# Patient Record
Sex: FEMALE | Race: BLACK OR AFRICAN AMERICAN
[De-identification: names, ages, dates, MRNs, and addresses within clinical notes are randomized per-mention and may not be internally consistent; named-entity substitution may affect disease eponyms.]

---

## 2019-01-23 PROBLEM — Z00.00 ENCOUNTER FOR PREVENTIVE HEALTH EXAMINATION: Status: ACTIVE | Noted: 2019-01-23

## 2019-02-04 ENCOUNTER — APPOINTMENT (OUTPATIENT)
Dept: VASCULAR SURGERY | Facility: CLINIC | Age: 69
End: 2019-02-04
Payer: MEDICARE

## 2019-02-04 VITALS
BODY MASS INDEX: 41.95 KG/M2 | SYSTOLIC BLOOD PRESSURE: 134 MMHG | HEIGHT: 70 IN | OXYGEN SATURATION: 97 % | WEIGHT: 293 LBS | TEMPERATURE: 98 F | HEART RATE: 75 BPM | DIASTOLIC BLOOD PRESSURE: 88 MMHG

## 2019-02-04 PROCEDURE — 99203 OFFICE O/P NEW LOW 30 MIN: CPT

## 2019-02-06 NOTE — ASSESSMENT
[FreeTextEntry1] : 69 y/o F with right leg pain and numbness and tingling in toes and fingers. On exam, patient has strong palpable pulses throughout legs, skin is warm and well perfused. No vascular issues at this time. Suspect her symptoms are related to chronic back pain and issues. She will follow-up here as needed.

## 2019-02-06 NOTE — HISTORY OF PRESENT ILLNESS
[FreeTextEntry1] : 67 y/o F with HLD, HTN, and multiple issues with her back here for initial evaluation of right leg pain and numbness and tinling in her toes and fingers. patient reports pain throughout her entire right leg, worse with laying down and at the end of the day. She reports that she has developed two dark spot to her right calf and was told by her dermatologist these are possible varicose veins. She also reports numbness and tingling in her toes and fingers as well. She also endorses some associated swelling in her legs. She denies any claudication or ulcerations. She denies any history of DVT.

## 2019-02-06 NOTE — PHYSICAL EXAM
[Respiratory Effort] : normal respiratory effort [Normal Heart Sounds] : normal heart sounds [2+] : left 2+ [Ankle Swelling (On Exam)] : present [Varicose Veins Of Lower Extremities] : present [Varicose Veins Of The Right Leg] : of the right leg [No Rash or Lesion] : No rash or lesion [Alert] : alert [Calm] : calm [JVD] : no jugular venous distention  [] : not present [de-identified] : Well appearing, NAD, obese [de-identified] : NCAT [de-identified] : FROM, 5/5 strength

## 2019-02-06 NOTE — END OF VISIT
[FreeTextEntry3] : Documented by Behzad Gonzales acting as a scribe for Dr. Mary Ellen Newton on 2/4/2019

## 2019-10-18 ENCOUNTER — HOSPITAL ENCOUNTER (OUTPATIENT)
Dept: HOSPITAL 74 - JASUSAT | Age: 69
LOS: 1 days | Discharge: HOME | End: 2019-10-19
Attending: PLASTIC SURGERY
Payer: SELF-PAY

## 2019-10-18 VITALS — BODY MASS INDEX: 47.9 KG/M2

## 2019-10-18 DIAGNOSIS — Z41.1: Primary | ICD-10-CM

## 2019-10-18 PROCEDURE — 0J080ZZ ALTERATION OF ABDOMEN SUBCUTANEOUS TISSUE AND FASCIA, OPEN APPROACH: ICD-10-PCS | Performed by: PLASTIC SURGERY

## 2019-10-18 PROCEDURE — 0J083ZZ ALTERATION OF ABDOMEN SUBCUTANEOUS TISSUE AND FASCIA, PERCUTANEOUS APPROACH: ICD-10-PCS | Performed by: PLASTIC SURGERY

## 2019-10-18 RX ADMIN — ACETAMINOPHEN AND CODEINE PHOSPHATE PRN TAB: 300; 30 TABLET ORAL at 22:14

## 2019-10-18 RX ADMIN — SODIUM CHLORIDE, POTASSIUM CHLORIDE, SODIUM LACTATE AND CALCIUM CHLORIDE SCH MLS: 600; 310; 30; 20 INJECTION, SOLUTION INTRAVENOUS at 16:06

## 2019-10-19 VITALS — HEART RATE: 62 BPM | DIASTOLIC BLOOD PRESSURE: 74 MMHG | SYSTOLIC BLOOD PRESSURE: 143 MMHG | TEMPERATURE: 98.1 F

## 2019-10-19 RX ADMIN — SODIUM CHLORIDE, POTASSIUM CHLORIDE, SODIUM LACTATE AND CALCIUM CHLORIDE SCH MLS/HR: 600; 310; 30; 20 INJECTION, SOLUTION INTRAVENOUS at 00:17

## 2019-10-19 RX ADMIN — ACETAMINOPHEN AND CODEINE PHOSPHATE PRN TAB: 300; 30 TABLET ORAL at 05:27

## 2019-10-19 NOTE — PN
Progress Note (short form)





- Note


Progress Note: 





Anesthesia Post op


Pt seen and examined


S:Alert and awake comfortable


O:


 Vital Signs











Temperature  98.5 F   10/19/19 05:00


 


Pulse Rate  70   10/19/19 05:00


 


Respiratory Rate  20   10/19/19 05:00


 


Blood Pressure  125/79   10/19/19 05:00


 


O2 Sat by Pulse Oximetry (%)  98   10/18/19 16:56











A/P:


s/p abdominoplasty, liposuction


Doing well post op


Continue current care


Jace Kelley M.D.

## 2020-07-23 ENCOUNTER — TRANSCRIPTION ENCOUNTER (OUTPATIENT)
Age: 70
End: 2020-07-23

## 2021-07-27 ENCOUNTER — TRANSCRIPTION ENCOUNTER (OUTPATIENT)
Age: 71
End: 2021-07-27

## 2022-01-23 ENCOUNTER — TRANSCRIPTION ENCOUNTER (OUTPATIENT)
Age: 72
End: 2022-01-23

## 2022-10-24 ENCOUNTER — NON-APPOINTMENT (OUTPATIENT)
Age: 72
End: 2022-10-24

## 2022-11-01 ENCOUNTER — NON-APPOINTMENT (OUTPATIENT)
Age: 72
End: 2022-11-01

## 2022-11-26 ENCOUNTER — NON-APPOINTMENT (OUTPATIENT)
Age: 72
End: 2022-11-26